# Patient Record
Sex: FEMALE | URBAN - METROPOLITAN AREA
[De-identification: names, ages, dates, MRNs, and addresses within clinical notes are randomized per-mention and may not be internally consistent; named-entity substitution may affect disease eponyms.]

---

## 2017-03-20 ENCOUNTER — GENERIC CONVERSION - ENCOUNTER (OUTPATIENT)
Dept: OTHER | Facility: OTHER | Age: 22
End: 2017-03-20

## 2018-01-15 NOTE — MISCELLANEOUS
To whom it may concern,     Patient Rodrigo Mcclendon ( : 1995) had an echo ordered and completed due to palpitations, dizziness, and audible systolic murmur  If you have any questions please contact  my office at [phone]      Thank [facility]      Electronically signed Vilma Mane   Mar 20 2017 11:30AM EST